# Patient Record
Sex: MALE | Race: ASIAN | Employment: FULL TIME | URBAN - METROPOLITAN AREA
[De-identification: names, ages, dates, MRNs, and addresses within clinical notes are randomized per-mention and may not be internally consistent; named-entity substitution may affect disease eponyms.]

---

## 2021-06-02 ENCOUNTER — HOSPITAL ENCOUNTER (OUTPATIENT)
Dept: GENERAL RADIOLOGY | Age: 36
Discharge: HOME | End: 2021-06-02
Payer: COMMERCIAL

## 2021-06-02 DIAGNOSIS — M25.512 PAIN OF LEFT SHOULDER REGION: ICD-10-CM

## 2021-06-02 PROCEDURE — 73030 X-RAY EXAM OF SHOULDER: CPT

## 2021-08-11 ENCOUNTER — TRANSCRIBE ORDER (OUTPATIENT)
Dept: SCHEDULING | Age: 36
End: 2021-08-11

## 2021-08-11 DIAGNOSIS — M25.512 PAIN OF LEFT SHOULDER REGION: Primary | ICD-10-CM

## 2022-07-13 ENCOUNTER — HOSPITAL ENCOUNTER (OUTPATIENT)
Dept: GENERAL RADIOLOGY | Age: 37
Discharge: HOME | End: 2022-07-13
Payer: COMMERCIAL

## 2022-07-13 DIAGNOSIS — M54.9 DORSALGIA: ICD-10-CM

## 2022-07-13 PROCEDURE — 72070 X-RAY EXAM THORAC SPINE 2VWS: CPT

## 2022-07-13 PROCEDURE — 72100 X-RAY EXAM L-S SPINE 2/3 VWS: CPT

## 2022-09-02 ENCOUNTER — HOSPITAL ENCOUNTER (OUTPATIENT)
Dept: PHYSICAL THERAPY | Age: 37
Discharge: HOME | End: 2022-09-02
Payer: COMMERCIAL

## 2022-09-02 PROCEDURE — 97530 THERAPEUTIC ACTIVITIES: CPT | Performed by: PHYSICAL THERAPIST

## 2022-09-02 PROCEDURE — 97110 THERAPEUTIC EXERCISES: CPT | Performed by: PHYSICAL THERAPIST

## 2022-09-02 PROCEDURE — 97162 PT EVAL MOD COMPLEX 30 MIN: CPT | Performed by: PHYSICAL THERAPIST

## 2022-09-02 PROCEDURE — 97140 MANUAL THERAPY 1/> REGIONS: CPT | Performed by: PHYSICAL THERAPIST

## 2022-09-02 PROCEDURE — 97112 NEUROMUSCULAR REEDUCATION: CPT | Performed by: PHYSICAL THERAPIST

## 2022-09-02 NOTE — PROGRESS NOTES
[address]  [facility], [address] [place] [address], [address] [place], [place] [address],[place] [address]  [phone] fax      Plan of Care/ Statement of Necessity for Physical Therapy Services    Patient name: Randy Gomes Start of Care: 2022   Referral source: John Araiza NP : 1985    Medical Diagnosis: Other low back pain [M54.59]  Payor: Juanis Every / Plan: Alan Rodgers PPO / Product Type: PPO /  Onset Date:8/15/22    Treatment Diagnosis: Low back Pain   Prior Hospitalization: see medical history Provider#: [id number]   Medications: Verified on Patient summary List    Comorbidities: back pain, GID, headaches   Prior Level of Function: I with ADLs, working full time, able to lift and carry heavy things without lasting pain or discomfort. The Plan of Care and following information is based on the information from the initial evaluation. Assessment/ key information: Patient is pa pleasant middle-aged adult male with sub-acute on chronic low back pain of insidious onset, associated with episodes of severe peripheral sensations into upper Left quadrant and neck. Special testing today is negative for radicular symptoms but does appear to have irritation at the thoracolumbar junction. Pain appears to be peripheral neuropathic in nature, stemming from this area. Treatment will focus on functional lifting and squatting tolerance as well as increasing lumbar extension movement pattern. Evaluation Complexity History MEDIUM  Complexity : 1-2 comorbidities / personal factors will impact the outcome/ POC ; Examination MEDIUM Complexity : 3 Standardized tests and measures addressing body structure, function, activity limitation and / or participation in recreation  ;Presentation MEDIUM Complexity : Evolving with changing characteristics  ; Clinical Decision Making MEDIUM Complexity : FOTO score of 26-74  Overall Complexity Rating: MEDIUM  Problem List: pain affecting function, decrease ROM, decrease strength, edema affecting function, impaired gait/ balance, decrease ADL/ functional abilitiies, decrease activity tolerance, decrease flexibility/ joint mobility, and decrease transfer abilities   Treatment Plan may include any combination of the following: Therapeutic exercise, Therapeutic activities, Neuromuscular re-education, Physical agent/modality, Gait/balance training, Manual therapy, Patient education, Self Care training, Functional mobility training, Home safety training, and Stair training  Patient / Family readiness to learn indicated by: asking questions, trying to perform skills, and interest  Persons(s) to be included in education: patient (P)  Barriers to Learning/Limitations: None  Patient Goal (s): I want to be able to 'double-fist' both kids (one on the way)  Patient Self Reported Health Status: good  Rehabilitation Potential: good    Short Term Goals: To be accomplished in 5 treatments:  1. Patient will be I with HEP for carryover to home management               Eval: established  2. Patient will be and to squat lift and carry 50# for 100 ft 2 times without difficulty. Eval: 50# with moderate difficulty for 50ft. 3. Patient will be able to demonstrate 10 degrees of active lumbar extension to facilitate body mechanics during functional lifting. Eval: 3 degrees    Long Term Goals: To be accomplished in 10 treatments:  1. Patient will achieve predicted FOTO score of 78 for demonstration of improved function. Eval: 71  2. Patient will be and to squat lift and carry 75# for 100 ft 2 times without difficulty. Eval: 90# with moderate difficulty for 50ft. 3. Patient will be able to demonstrate 15 degrees of active lumbar extension to facilitate body mechanics during functional lifting. Eval: 3 degrees    Frequency / Duration: Patient to be seen 1-3 times per week for 10 treatments.     Patient/ Caregiver education and instruction: Diagnosis, prognosis, self care, activity modification, and exercises   [x]  Plan of care has been reviewed with PTA    Bessie William, PT 9/2/2022 2:26 PM  ________________________________________________________________________    I certify that the above Therapy Services are being furnished while the patient is under my care. I agree with the treatment plan and certify that this therapy is necessary.     [de-identified] Signature:____________Date:_________TIME:________     Yasmany Stuart NP  ** Signature, Date and Time must be completed for valid certification **      Please sign and return to In [address] [place] [address] [address]  [address] [place] [address], [address] [place] [address], [address] [place] [address],[facility] 300 [phone] fax

## 2022-09-02 NOTE — PROGRESS NOTES
PT DAILY TREATMENT NOTE     Patient Name: Kip Davis  Date:2022  : 1985  [x]  Patient  Verified  Payor: Bobby Coffey / Plan: Prasanth Landrum PPO / Product Type: PPO /    In time:2:20P  Out time:3:05P  Total Treatment Time (min): 45min  Visit #: 1 of 10    Treatment Area: Other low back pain [M54.59]    SUBJECTIVE  Pain Level (0-10 scale): 4/10  Any medication changes, allergies to medications, adverse drug reactions, diagnosis change, or new procedure performed?: [x] No    [] Yes (see summary sheet for update)  Subjective functional status/changes:   [] No changes reported    PMHx: Usually sits straight out of bed; 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 Occasional onset of numbness and tingling on the left side that leaves him incapacitated for 3 days. Biggest complaint is when first getting up in the morning - usually feels better once gets up and moving. New mattress has reduced pain by 80%. Aggravating factors:  long positioning followed by quick motion of the neck, prolonged bending over. Eases: Temperpedic mattress, back massage    Exercise: flutter kicks, planks, sit ups, push ups, jumping jacks, rock climbs for 10 min every day    OBJECTIVE    15 min [x]Eval                  []Re-Eval       12 min Therapeutic Exercise:  [x] See flow sheet :   Rationale: increase ROM and increase strength to improve the patients ability to Tolerate basic ADLs and job-related tasks without pain. 10 min Therapeutic Activity:  [x]  See flow sheet :   Rationale: increase strength and improve coordination  to improve the patients ability to perform functional tasks such as overhead reach.      10 min Neuromuscular Re-education:  [x]  See flow sheet :   Rationale: improve coordination, improve balance, and increase proprioception  to improve the patients ability to perform activities with good form and proprioception with tactile and verbal cuing appropriately. 10 min Manual Therapy:  Grade 5 mobs to Thoraco-lumbar junction in prone in P/A direction with positive cavitation and tension release. The manual therapy interventions were performed at a separate and distinct time from the therapeutic activities interventions. Rationale: decrease pain, increase ROM, increase tissue extensibility, and decrease edema  to perform activities with good form and proprioception with tactile and verbal cuing appropriately. With   [x] TE   [x] TA   [x] neuro   [] other: Patient Education: [x] Review HEP    [x] Progressed/Changed HEP based on:   [x] positioning   [] body mechanics   [] transfers   [] heat/ice application    [] other:      Other Objective/Functional Measures:     Lumbar   extension 3 degrees  Flexion: 10 degrees  Rotation: 25 degrees  Sidebend: 15 degrees    Hip Flexion ROM: limited to 95 degrees bilaterally    MMT   Hip and leg musculature: 4/5 throughout    Abdominal strength: 4+/5      SLS: 30 secs Right and Left    Squat Test: increased lumbar flexion at the bottom, inability to achieve full position due to limited him mobility    Pain Level (0-10 scale) post treatment: 3-4/10    ASSESSMENT/Changes in Function: Patient is pa pleasant middle-aged adult male with sub-acute on chronic low back pain of insidious onset, associated with episodes of severe peripheral sensations into upper Left quadrant and neck. Special testing today is negative for radicular symptoms but does appear to have irritation at the thoracolumbar junction. Pain appears to be peripheral neuropathic in nature, stemming from this area. Treatment will focus on functional lifting and squatting tolerance as well as increasing lumbar extension movement pattern.      Patient will continue to benefit from skilled PT services to modify and progress therapeutic interventions, address functional mobility deficits, address ROM deficits, address strength deficits, analyze and address soft tissue restrictions, analyze and cue movement patterns, analyze and modify body mechanics/ergonomics, assess and modify postural abnormalities, address imbalance/dizziness, and instruct in home and community integration to attain remaining goals. [x]  See Plan of Care  []  See progress note/recertification  []  See Discharge Summary         Progress towards goals / Updated goals:  See POC    PLAN  [x]  Upgrade activities as tolerated     [x]  Continue plan of care  []  Update interventions per flow sheet       []  Discharge due to:_  []  Other:_      Sydnee Trujillo, PT 9/2/2022  2:27 PM    No future appointments.

## 2022-09-16 ENCOUNTER — HOSPITAL ENCOUNTER (OUTPATIENT)
Dept: PHYSICAL THERAPY | Age: 37
Discharge: HOME | End: 2022-09-16
Payer: COMMERCIAL

## 2022-09-16 PROCEDURE — 97112 NEUROMUSCULAR REEDUCATION: CPT

## 2022-09-16 PROCEDURE — 97140 MANUAL THERAPY 1/> REGIONS: CPT

## 2022-09-16 PROCEDURE — 97110 THERAPEUTIC EXERCISES: CPT

## 2022-09-16 NOTE — PROGRESS NOTES
PT DAILY TREATMENT NOTE     Patient Name: Pacheco Mode  TNFM:  : 1985  [x]  Patient  Verified  Payor: Lia Oakes / Plan: Estle Carbon PPO / Product Type: PPO /    In time:221 pm  Out time:310 pm   Total Treatment Time (min):49  Visit #: 2 of 10    Treatment Area: Other low back pain [M54.59]    SUBJECTIVE  Pain Level (0-10 scale): 3-4/10  Any medication changes, allergies to medications, adverse drug reactions, diagnosis change, or new procedure performed?: [x] No    [] Yes (see summary sheet for update)  Subjective functional status/changes:   [] No changes reported  Patient reports constant stiffness along his entire back today. OBJECTIVE    Modality rationale: decrease inflammation, decrease pain, and increase tissue extensibility to improve the patients ability to assist with performing ADL's and functional tasks without limitations.     Min Type Additional Details    [] Estim:  []Unatt       []IFC  []Premod                        []Other:  []w/ice   []w/heat  Position:  Location:    [] Estim: []Att    []TENS instruct  []NMES                    []Other:  []w/US   []w/ice   []w/heat  Position:  Location:    []  Traction: [] Cervical       []Lumbar                       [] Prone          []Supine                       []Intermittent   []Continuous Lbs:  [] before manual  [] after manual    []  Ultrasound: []Continuous   [] Pulsed                           []1MHz   []3MHz W/cm2:  Location:    []  Iontophoresis with dexamethasone         Location: [] Take home patch   [] In clinic   10 []  Ice     [x]  heat  []  Ice massage  []  Laser   []  Anodyne Position:prone  Location:L/S    []  Laser with stim  []  Other:  Position:  Location:    []  Vasopneumatic Device    []  Right     []  Left  Pre-treatment girth:  Post-treatment girth:  Measured at (location):  Pressure:       [] lo [] med [] hi   Temperature: [] lo [] med [] hi   [] Skin assessment post-treatment:  []intact []redness- no adverse reaction  []redness - adverse reaction:     15 min Therapeutic Exercise:  [] See flow sheet :   Rationale: increase ROM and increase strength to improve the patients overall muscle endurance and activity tolerance for ADL's and functional tasks. min Therapeutic Activity:  []  See flow sheet :   Rationale: increase strength and improve coordination  to improve the patients ability to safely perform dynamic activities and to improve functional performance of  ADL's. 14 min Neuromuscular Re-education:  []  See flow sheet :   Rationale: increase ROM, increase strength, and improve coordination  to improve the patients ability to perform activities with good form, stability and proprioception. 10 min Manual Therapy:  Shot gun mobs/MET; DTM and stretching (B) piriformis and glute med    The manual therapy interventions were performed at a separate and distinct time from the therapeutic activities interventions. Rationale: decrease pain, increase ROM, and increase tissue extensibility to assist with performing ADL's with ease. With   [] TE   [] TA   [] neuro   [] other: Patient Education: [x] Review HEP    [] Progressed/Changed HEP based on:   [] positioning   [] body mechanics   [] transfers   [] heat/ice application    [] other:      Other Objective/Functional Measures:  Initiated exercises set a initial evaluation. Verbal cues required for proper form. Right posteriorly rotated SI joint     Pain Level (0-10 scale) post treatment: 0/10     ASSESSMENT/Changes in Function:   Patient is progressing as expected towards goals. Introduced exercises, as per flow sheet, to assist with increasing core and low back strength. Patient required constant verbal cues with today's exercises. SI joint dysfunction improved following manual therapy. Patient responded well to today's overall session, as evident by, a decrease in low back pain.    Patient will continue to benefit from skilled PT services to modify and progress therapeutic interventions, address functional mobility deficits, address strength deficits, analyze and address soft tissue restrictions, analyze and cue movement patterns, analyze and modify body mechanics/ergonomics, assess and modify postural abnormalities, and instruct in home and community integration to attain remaining goals. []  See Plan of Care  []  See progress note/recertification  []  See Discharge Summary         Progress towards goals / Updated goals:     Short Term Goals: To be accomplished in 5 treatments:  1. Patient will be I with HEP for carryover to home management               Eval: established  2. Patient will be and to squat lift and carry 50# for 100 ft 2 times without difficulty. Eval: 50# with moderate difficulty for 50ft. 3. Patient will be able to demonstrate 10 degrees of active lumbar extension to facilitate body mechanics during functional lifting. Eval: 3 degrees     Long Term Goals: To be accomplished in 10 treatments:  1. Patient will achieve predicted FOTO score of 78 for demonstration of improved function. Eval: 71  2. Patient will be and to squat lift and carry 75# for 100 ft 2 times without difficulty. Eval: 90# with moderate difficulty for 50ft. 3. Patient will be able to demonstrate 15 degrees of active lumbar extension to facilitate body mechanics during functional lifting. Eval: 3 degrees    PLAN  [x]  Upgrade activities as tolerated     [x]  Continue plan of care  []  Update interventions per flow sheet       []  Discharge due to:_  []  Other:_      Carly Huizar, PTA 9/16/2022  2:48 PM    No future appointments.

## 2022-09-30 ENCOUNTER — HOSPITAL ENCOUNTER (OUTPATIENT)
Dept: PHYSICAL THERAPY | Age: 37
Discharge: HOME | End: 2022-09-30
Payer: COMMERCIAL

## 2022-09-30 PROCEDURE — 97530 THERAPEUTIC ACTIVITIES: CPT

## 2022-09-30 PROCEDURE — 97110 THERAPEUTIC EXERCISES: CPT

## 2022-09-30 PROCEDURE — 97140 MANUAL THERAPY 1/> REGIONS: CPT

## 2022-09-30 NOTE — PROGRESS NOTES
PT DAILY TREATMENT NOTE     Patient Name: Bernard Summers  Date:2022  : 1985  [x]  Patient  Verified  Payor: Neftali Jacobo / Plan: Parker Garcia PPO / Product Type: PPO /    In time:215 pm  Out time:306  pm   Total Treatment Time (min):51  Visit #: 3 of 10    Treatment Area: Other low back pain [M54.59]    SUBJECTIVE  Pain Level (0-10 scale): 10  Any medication changes, allergies to medications, adverse drug reactions, diagnosis change, or new procedure performed?: [x] No    [] Yes (see summary sheet for update)  Subjective functional status/changes:   [] No changes reported  Patient reports increased low back pain today after putting his son to bed last night. Patient explains that he got a jolt of pain when bending over to put him down. Patient also reports increased pain in both legs today. OBJECTIVE    Modality rationale: Patient declined modalities today.      Min Type Additional Details    [] Estim:  []Unatt       []IFC  []Premod                        []Other:  []w/ice   []w/heat  Position:  Location:    [] Estim: []Att    []TENS instruct  []NMES                    []Other:  []w/US   []w/ice   []w/heat  Position:  Location:    []  Traction: [] Cervical       []Lumbar                       [] Prone          []Supine                       []Intermittent   []Continuous Lbs:  [] before manual  [] after manual    []  Ultrasound: []Continuous   [] Pulsed                           []1MHz   []3MHz W/cm2:  Location:    []  Iontophoresis with dexamethasone         Location: [] Take home patch   [] In clinic    []  Ice     []  heat  []  Ice massage  []  Laser   []  Anodyne Position:  Location:    []  Laser with stim  []  Other:  Position:  Location:    []  Vasopneumatic Device    []  Right     []  Left  Pre-treatment girth:  Post-treatment girth:  Measured at (location):  Pressure:       [] lo [] med [] hi   Temperature: [] lo [] med [] hi   [] Skin assessment post-treatment:  []intact []redness- no adverse reaction  []redness - adverse reaction:     16 min Therapeutic Exercise:  [] See flow sheet :   Rationale: increase ROM and increase strength to improve the patients overall muscle endurance and activity tolerance for ADL's and functional tasks. 25 min Therapeutic Activity:  []  See flow sheet :   Rationale: increase strength and improve coordination  to improve the patients ability to safely perform dynamic activities and to improve functional performance of  ADL's.       min Neuromuscular Re-education:  []  See flow sheet :   Rationale: increase ROM, increase strength, and improve coordination  to improve the patients ability to perform activities with good form, stability and proprioception. 10 min Manual Therapy:  Shot gun mobs/MET; DTM and stretching (B) piriformis and glute med    The manual therapy interventions were performed at a separate and distinct time from the therapeutic activities interventions. Rationale: decrease pain, increase ROM, and increase tissue extensibility to assist with performing ADL's with ease. With   [] TE   [] TA   [] neuro   [] other: Patient Education: [x] Review HEP    [] Progressed/Changed HEP based on:   [] positioning   [] body mechanics   [] transfers   [] heat/ice application    [] other:      Other Objective/Functional Measures:  Functional Gains: N/A  Functional Deficits: Sleeping, lifting and flexibility  % improvement: 20-25% improvement   Pain   Average: 4-6/10       Best: 1/10     Worst: 10/10  Patient Goal: \"To improve flexibility and loosen muscles. \"     Active lumbar extension- 15 degrees    Right posteriorly rotated SI joint    Pain Level (0-10 scale) post treatment: 0/10     ASSESSMENT/Changes in Function:   Patient has only attended 3 PT visits since IE due to work and family schedule. Patient is progressing as expected. Patient continues to have increased low back pain at unexpected times that causes him to be \"immobilized\".  Sleeping, lifting and flexibility continues to be an issue for the patient. Patient reports 20-25% improvement with PT and would like to continue with PT in order to decrease low back pain and return to PLOF. Patient will continue to benefit from skilled PT services to modify and progress therapeutic interventions, address functional mobility deficits, address strength deficits, analyze and address soft tissue restrictions, analyze and cue movement patterns, analyze and modify body mechanics/ergonomics, assess and modify postural abnormalities, and instruct in home and community integration to attain remaining goals. []  See Plan of Care  [x]  See progress note/recertification  []  See Discharge Summary         Progress towards goals / Updated goals:     Short Term Goals: To be accomplished in 5 treatments:  1. Patient will be I with HEP for carryover to home management               Eval: established   Current: Patient reports compliance once a day. MET   2. Patient will be and to squat lift and carry 50# for 100 ft 2 times without difficulty. Eval: 50# with moderate difficulty for 50ft. Current: Patient is able to perform 50# box lift with no difficulty. MET     3. Patient will be able to demonstrate 10 degrees of active lumbar extension to facilitate body mechanics during functional lifting. Eval: 3 degrees    Current: Active lumbar extension- 15 degrees. MET   Long Term Goals: To be accomplished in 10 treatments:  1. Patient will achieve predicted FOTO score of 78 for demonstration of improved function. Eval: 71   Current: 71 points. REMAINS  2. Patient will be and to squat lift and carry 75# for 100 ft 2 times without difficulty. Eval: 90# with moderate difficulty for 50ft. Current: Patient experienced increased low back pain and poor form at the end of carry with 75# box lift. REMAINS  3.  Patient will be able to demonstrate 15 degrees of active lumbar extension to facilitate body mechanics during functional lifting. Eval: 3 degrees   Current: Active lumbar extension- 15 degrees. MET     PLAN  [x]  Upgrade activities as tolerated     [x]  Continue plan of care  []  Update interventions per flow sheet       []  Discharge due to:_  []  Other:_      Lucie William, JANE 9/30/2022  2:48 PM    No future appointments.

## 2022-09-30 NOTE — PROGRESS NOTES
[facility]  [facility], [address] [place] [address], [address] [place], [place] [address]y [address]  [phone] fax    Physician Update  [x] Progress Note  [] Discharge Summary  Patient name: Niurka Chavez Start of Care: 2022   Referral source: Floyd Thomas NP : 1985                Medical Diagnosis: Other low back pain [M54.59]  Payor: Howard Moyeriling / Plan: Mica Desir PPO / Product Type: PPO /  Onset Date:8/15/22                Treatment Diagnosis: Low back Pain   Prior Hospitalization: see medical history Provider#: [id number]   Medications: Verified on Patient summary List    Comorbidities: back pain, GID, headaches   Prior Level of Function: I with ADLs, working full time, able to lift and carry heavy things without lasting pain or discomfort. Visits from Start of Care: 3   Missed Visits: 0    Status at Evaluation/Last Progress Note:   Patient is pa pleasant middle-aged adult male with sub-acute on chronic low back pain of insidious onset, associated with episodes of severe peripheral sensations into upper Left quadrant and neck. Special testing today is negative for radicular symptoms but does appear to have irritation at the thoracolumbar junction. Pain appears to be peripheral neuropathic in nature, stemming from this area. Treatment will focus on functional lifting and squatting tolerance as well as increasing lumbar extension movement pattern. Progress towards Goals:   Functional Gains: N/A  Functional Deficits: Sleeping, lifting and flexibility  % improvement: 20-25% improvement   Pain   Average: -6/10                  Best: 1/10                Worst: 10/10  Patient Goal: \"To improve flexibility and loosen muscles. \"      Short Term Goals: To be accomplished in 5 treatments:  1. Patient will be I with [facility] for carryover to home management               Eval: established              Current: Patient reports compliance once a day. MET   2.  Patient will be and to squat lift and carry 50# for 100 ft 2 times without difficulty. Eval: 50# with moderate difficulty for 50ft. Current: Patient is able to perform 50# box lift with no difficulty. MET      3. Patient will be able to demonstrate 10 degrees of active lumbar extension to facilitate body mechanics during functional lifting. Eval: 3 degrees               Current: Active lumbar extension- 15 degrees. MET   Long Term Goals: To be accomplished in 10 treatments:  1. Patient will achieve predicted FOTO score of 78 for demonstration of improved function. Eval: 71              Current: 71 points. REMAINS  2. Patient will be and to squat lift and carry 75# for 100 ft 2 times without difficulty. Eval: 90# with moderate difficulty for 50ft. Current: Patient experienced increased low back pain and poor form at the end of carry with 75# box lift. REMAINS  3. Patient will be able to demonstrate 15 degrees of active lumbar extension to facilitate body mechanics during functional lifting. Eval: 3 degrees              Current: Active lumbar extension- 15 degrees. MET     Goals: to be achieved in 4 weeks:  1. Patient will achieve predicted FOTO score of 78 for demonstration of improved function. PN: Unchanged from IE, 71 points. 2. Patient will be and to squat lift and carry 75# for 100 ft 2 times without difficulty. PN: Patient experienced increased low back pain and poor form at the end of carry with 75# box lift. 3. Patient will report 2-3 consecutive hours of sleep with 2/10 low back pain in order to improve overall health and quality of life. PN: Patient sleeps for 30-45 minutes before waking up due to increased low back pain (6/10) and having to reposition himself. ASSESSMENT/RECOMMENDATIONS:  Patient has only attended 3 PT visits since IE due to work and family schedule. Patient is progressing as expected.  Patient continues to have increased low back pain at unexpected times that causes him to be \"immobilized\". Sleeping, lifting and flexibility continues to be an issue for the patient. Patient reports 20-25% improvement with PT and would like to continue with PT in order to decrease low back pain and return to PLOF. Patient will continue to benefit from skilled PT services to modify and progress therapeutic interventions, address functional mobility deficits, address strength deficits, analyze and address soft tissue restrictions, analyze and cue movement patterns, analyze and modify body mechanics/ergonomics, assess and modify postural abnormalities, and instruct in home and community integration to attain remaining goals. [x]Continue therapy per initial plan/protocol at a frequency of 1-2 x per week for 4 weeks  []Continue therapy with the following recommended changes:_____________________      _____________________________________________________________________  []Discontinue therapy progressing towards or have reached established goals  []Discontinue therapy due to lack of appreciable progress towards goals  []Discontinue therapy due to lack of attendance or compliance  []Await Physician's recommendations/decisions regarding therapy  []Other:________________________________________________________________    Thank you for this referral. Renata Schroeder, PTA 9/30/2022 3:00 PM  NOTE TO PHYSICIAN:  PLEASE COMPLETE THE ORDERS BELOW AND   FAX TO [facility]: [phone]  If you are unable to process this request in 24 hours please contact our office: [phone]    I have read the above report and request that my patient continue as recommended. I have read the above report and request that my patient continue therapy with the following changes/special instructions:_____________________________________  I have read the above report and request that my patient be discharged from therapy.     [facility] Signature:____________Date:_________TIME:________     Carmen Walter NP  ** Signature, Date and Time must be completed for valid certification **

## 2022-10-14 ENCOUNTER — APPOINTMENT (OUTPATIENT)
Dept: PHYSICAL THERAPY | Age: 37
End: 2022-10-14
Payer: COMMERCIAL

## 2022-10-20 ENCOUNTER — HOSPITAL ENCOUNTER (OUTPATIENT)
Dept: PHYSICAL THERAPY | Age: 37
Discharge: HOME | End: 2022-10-20
Payer: COMMERCIAL

## 2022-10-20 PROCEDURE — 97530 THERAPEUTIC ACTIVITIES: CPT

## 2022-10-20 PROCEDURE — 97112 NEUROMUSCULAR REEDUCATION: CPT

## 2022-10-20 PROCEDURE — 97110 THERAPEUTIC EXERCISES: CPT

## 2022-10-20 NOTE — PROGRESS NOTES
PT DAILY TREATMENT NOTE     Patient Name: Joni Weldon  HX:  : 1985  [x]  Patient  Verified  Payor: Howard Mijares / Plan: Gabriela Sandoval PPO / Product Type: PPO /    In time: 6:00 Out time:6:34  Total Treatment Time (min): 34  Visit #: 4 of 10    Medicare/BCBS Only   Total Timed Codes (min):   1:1 Treatment Time:         Treatment Area: Other low back pain [M54.59]    SUBJECTIVE  Pain Level (0-10 scale): 4  Any medication changes, allergies to medications, adverse drug reactions, diagnosis change, or new procedure performed?: [x] No    [] Yes (see summary sheet for update)  Subjective functional status/changes:   [] No changes reported  \"I have pain at my traps. \"    OBJECTIVE    Modality rationale: decrease inflammation, decrease pain, and increase tissue extensibility to improve the patients ability to perform ADL    Min Type Additional Details    [] Estim:  []Unatt       []IFC  []Premod                        []Other:  []w/ice   []w/heat  Position:  Location:    [] Estim: []Att    []TENS instruct  []NMES                    []Other:  []w/US   []w/ice   []w/heat  Position:  Location:    []  Traction: [] Cervical       []Lumbar                       [] Prone          []Supine                       []Intermittent   []Continuous Lbs:  [] before manual  [] after manual    []  Ultrasound: []Continuous   [] Pulsed                           []1MHz   []3MHz W/cm2:  Location:    []  Iontophoresis with dexamethasone         Location: [] Take home patch   [] In clinic    []  Ice     []  heat  []  Ice massage  []  Laser   []  Anodyne Position:  Location:    []  Laser with stim  []  Other:  Position:  Location:    []  Vasopneumatic Device    []  Right     []  Left  Pre-treatment girth:  Post-treatment girth:  Measured at (location):  Pressure:       [] lo [] med [] hi   Temperature: [] lo [] med [] hi   [x] Skin assessment post-treatment:  [x]intact []redness- no adverse reaction    []redness - adverse reaction:      min []Eval                  []Re-Eval       16 min Therapeutic Exercise:  [x] See flow sheet :   Rationale: increase ROM and increase strength to improve the patients ability to perform ADL     10 min Therapeutic Activity:  [x]  See flow sheet :   Rationale: increase ROM, increase strength, and improve coordination  to improve the patients ability to perform work duties     8 min Neuromuscular Re-education:  [x]  See flow sheet :   Rationale: increase ROM, increase strength, improve coordination, improve balance, and increase proprioception  to improve the patients ability to ascend/descend stairs with no difficulty     min Manual Therapy: The manual therapy interventions were performed at a separate and distinct time from the therapeutic activities interventions. Rationale: decrease pain, increase ROM, increase tissue extensibility, decrease trigger points, and increase postural awareness to perform ADL      min Gait Training:  ___ feet with ___ device on level surfaces with ___ level of assist   Rationale:     min Self Care/Home Management:    Rationale: increase ROM, increase strength, and improve coordination  to improve the patients ability to perform ADL           With   [x] TE   [x] TA   [] neuro   [] other: Patient Education: [x] Review HEP    [] Progressed/Changed HEP based on:   [] positioning   [] body mechanics   [] transfers   [] heat/ice application    [] other:      Other Objective/Functional Measures:      Pain Level (0-10 scale) post treatment: 0    ASSESSMENT/Changes in Function: Today's session focused on pain reduction at (B) traps/TSP, core and LSP strengthening. Pt completed each core and LSP there ex fairly well with no pain. Pt presents with min/mod tightness along spine during manual.Overall pt is progressing towards all goals.     Patient will continue to benefit from skilled PT services to modify and progress therapeutic interventions, address functional mobility deficits, address ROM deficits, address strength deficits, analyze and address soft tissue restrictions, analyze and cue movement patterns, analyze and modify body mechanics/ergonomics, assess and modify postural abnormalities, and instruct in home and community integration to attain remaining goals. [x]  See Plan of Care  []  See progress note/recertification  []  See Discharge Summary         Progress towards goals / Updated goals:  1. Patient will be I with [facility] for carryover to home management               Eval: established              Current: Patient reports compliance once a day. MET   2. Patient will be and to squat lift and carry 50# for 100 ft 2 times without difficulty. Eval: 50# with moderate difficulty for 50ft. Current: Patient is able to perform 50# box lift with no difficulty. MET      3. Patient will be able to demonstrate 10 degrees of active lumbar extension to facilitate body mechanics during functional lifting. Eval: 3 degrees               Current: Active lumbar extension- 15 degrees. MET   Long Term Goals: To be accomplished in 10 treatments:  1. Patient will achieve predicted FOTO score of 78 for demonstration of improved function. Eval: 71              Current: 71 points. REMAINS  2. Patient will be and to squat lift and carry 75# for 100 ft 2 times without difficulty. Eval: 90# with moderate difficulty for 50ft. Current: Patient experienced increased low back pain and poor form at the end of carry with 75# box lift. REMAINS  3. Patient will be able to demonstrate 15 degrees of active lumbar extension to facilitate body mechanics during functional lifting. Eval: 3 degrees              Current: Active lumbar extension- 15 degrees.  MET        PLAN  [x]  Upgrade activities as tolerated     []  Continue plan of care  []  Update interventions per flow sheet       []  Discharge due to:_  []  Other:_ Kimberly Green, JANE 10/20/2022  6:01 PM    No future appointments.

## 2022-11-23 NOTE — PROGRESS NOTES
[facility] [address] [place] [id number] [facility], [address] [place] [address], [address] [place], [place] [address] [address]  [phone] fax    Discharge Summary    Referral source: Trang Camacho NP : 1985                Medical Diagnosis: Other low back pain [M54.59]  Payor: Pita Fletcher / Plan: Jarrell Peoples PPO / Product Type: PPO /  Onset Date:8/15/22                Treatment Diagnosis: Low back Pain   Prior Hospitalization: see medical history Provider#: [id number]   Medications: Verified on Patient summary List    Comorbidities: back pain, GID, headaches   Prior Level of Function: I with ADLs, working full time, able to lift and carry heavy things without lasting pain or discomfort. Visits from Start of Care: 4    Missed Visits: 1   Reporting Period :  to 10/20/2022      Summary of Care:  Goal:Patient will achieve predicted FOTO score of 78 for demonstration of improved function. Status at last note/certification:Unchanged from IE, 71 points  Status at discharge: not met    Goal:. Patient will be and to squat lift and carry 75# for 100 ft 2 times without difficulty. Status at last note/certification:Patient experienced increased low back pain and poor form at the end of carry with 75# box lift. Status at discharge: not met    Goal: Patient will report 2-3 consecutive hours of sleep with 2/10 low back pain in order to improve overall health and quality of life. Status at last note/certification[de-identified] Patient sleeps for 30-45 minutes before waking up due to increased low back pain (6/10) and having to reposition himself.    Status at discharge: not met    ASSESSMENT/RECOMMENDATIONS:  []Discontinue therapy progressing towards or have reached established goals  []Discontinue therapy due to lack of appreciable progress towards goals  []Discontinue therapy due to lack of attendance or compliance  [x]Other: Patient to be discharged at this time due to not scheduling follow PT visits in a timely everton.      Thank you for this referral.     Rubio Tolentino, PTA 11/23/2022 6:51 PM

## 2022-11-28 ENCOUNTER — TRANSCRIBE ORDER (OUTPATIENT)
Dept: SCHEDULING | Age: 37
End: 2022-11-28

## 2022-11-28 DIAGNOSIS — M25.512 LEFT SHOULDER PAIN: Primary | ICD-10-CM

## 2022-12-28 ENCOUNTER — TRANSCRIBE ORDER (OUTPATIENT)
Dept: SCHEDULING | Age: 37
End: 2022-12-28

## 2022-12-28 DIAGNOSIS — M54.50 LOW BACK PAIN: Primary | ICD-10-CM

## 2023-01-28 ENCOUNTER — HOSPITAL ENCOUNTER (OUTPATIENT)
Age: 38
End: 2023-01-28
Attending: PHYSICIAN ASSISTANT
Payer: COMMERCIAL

## 2023-01-28 DIAGNOSIS — M25.512 LEFT SHOULDER PAIN: ICD-10-CM

## 2023-01-28 DIAGNOSIS — M54.50 LOW BACK PAIN: ICD-10-CM

## 2023-01-28 PROCEDURE — 73221 MRI JOINT UPR EXTREM W/O DYE: CPT

## 2023-01-28 PROCEDURE — 72148 MRI LUMBAR SPINE W/O DYE: CPT

## 2023-03-29 ENCOUNTER — OFFICE VISIT (OUTPATIENT)
Age: 38
End: 2023-03-29
Payer: COMMERCIAL

## 2023-03-29 VITALS
HEIGHT: 69 IN | TEMPERATURE: 98.1 F | OXYGEN SATURATION: 98 % | HEART RATE: 74 BPM | BODY MASS INDEX: 28.73 KG/M2 | WEIGHT: 194 LBS

## 2023-03-29 DIAGNOSIS — M62.838 MUSCLE SPASM: ICD-10-CM

## 2023-03-29 DIAGNOSIS — M47.816 LUMBAR FACET ARTHROPATHY: Primary | ICD-10-CM

## 2023-03-29 DIAGNOSIS — M51.36 DDD (DEGENERATIVE DISC DISEASE), LUMBAR: ICD-10-CM

## 2023-03-29 PROBLEM — K50.80 CROHN'S DISEASE OF BOTH SMALL AND LARGE INTESTINE (HCC): Status: ACTIVE | Noted: 2021-10-21

## 2023-03-29 PROCEDURE — 99203 OFFICE O/P NEW LOW 30 MIN: CPT | Performed by: PHYSICAL MEDICINE & REHABILITATION

## 2023-03-29 RX ORDER — CETIRIZINE HYDROCHLORIDE 10 MG/1
10 TABLET ORAL DAILY
COMMUNITY
Start: 2022-12-20

## 2023-03-29 RX ORDER — AMLODIPINE BESYLATE 5 MG/1
TABLET ORAL
COMMUNITY
Start: 2023-03-27

## 2023-03-29 RX ORDER — SILDENAFIL 25 MG/1
25 TABLET, FILM COATED ORAL DAILY
COMMUNITY
Start: 2023-02-25

## 2023-03-29 RX ORDER — DEXTROAMPHETAMINE SACCHARATE, AMPHETAMINE ASPARTATE, DEXTROAMPHETAMINE SULFATE AND AMPHETAMINE SULFATE 2.5; 2.5; 2.5; 2.5 MG/1; MG/1; MG/1; MG/1
1 TABLET ORAL 2 TIMES DAILY
COMMUNITY
Start: 2022-12-23

## 2023-03-29 RX ORDER — METHYLPREDNISOLONE 4 MG/1
TABLET ORAL
Qty: 1 KIT | Refills: 0 | Status: SHIPPED | OUTPATIENT
Start: 2023-03-29

## 2023-03-29 RX ORDER — MELOXICAM 15 MG/1
15 TABLET ORAL DAILY
Qty: 30 TABLET | Refills: 1 | Status: SHIPPED | OUTPATIENT
Start: 2023-03-29

## 2023-03-29 ASSESSMENT — PATIENT HEALTH QUESTIONNAIRE - PHQ9
2. FEELING DOWN, DEPRESSED OR HOPELESS: 0
1. LITTLE INTEREST OR PLEASURE IN DOING THINGS: 0
SUM OF ALL RESPONSES TO PHQ QUESTIONS 1-9: 0
SUM OF ALL RESPONSES TO PHQ9 QUESTIONS 1 & 2: 0
SUM OF ALL RESPONSES TO PHQ QUESTIONS 1-9: 0

## 2023-03-29 NOTE — PROGRESS NOTES
Heather Pollock presents today for   Chief Complaint   Patient presents with    Back Pain       Is someone accompanying this pt? no    Is the patient using any DME equipment during OV? no    Depression Screening:  No flowsheet data found. Learning Assessment:  No flowsheet data found. Abuse Screening:  No flowsheet data found. Fall Risk  No flowsheet data found. OPIOID RISK TOOL  No flowsheet data found. Coordination of Care:  1. Have you been to the ER, urgent care clinic since your last visit? no  Hospitalized since your last visit? no    2. Have you seen or consulted any other health care providers outside of the [address] [place] [address] since your last visit? no Include any pap smears or colon screening.  no
expected lumbar lordosis. Dextroconvex curvature of the lumbar spine. No evidence of acute fracture. Bone marrow signal: Heterogeneous, nonspecific. Conus/filum: Normal in appearance and terminates at an appropriate level. Intervertebral disc height: Mild intervertebral disc space narrowing at L4-L5. Paraspinal tissues: The paraspinous soft tissues are within normal limits. Other: Lower lumbar congenital central canal narrowing     Specific segmental anatomy is as follows:     T12-L1: Central canal and neural foramina appear patent. L1-2: Mild broad-based disc bulge with small left paracentral protrusion disc  herniation. No significant central canal or foraminal narrowing. L2-3: Central canal and neural foramina appear patent. L3-4: Mild broad-based disc bulge with small central extrusion disc herniation. No significant central canal or foraminal narrowing. .     L4-5: Broad-based disc bulge/protrusion disc herniation with bilateral  subarticular recess narrowing, right greater than left. Contact and some  component of impingement of bilateral L5 nerve roots, right greater than left. Mild bilateral foraminal narrowing. Mild/moderate central canal narrowing. L5-S1: Central canal and neural foramina appear patent. IMPRESSION:     Straightening of expected lumbar lordosis and mild dextroconvex curvature of the  lumbar spine. Degenerative changes are accentuated in this patient with lower lumbar  congenital central canal narrowing. At L4-L5, broad-based disc bulge/protrusion disc herniation with subarticular  recess narrowing. Asymmetric contact/impingement of the right L5 nerve root at  its subarticular recess course. Mild/moderate central canal narrowing    Written by Volanda Cushing, as dictated by Jose Casillas MD.  I, Dr. Jose Casillas confirm that all documentation is accurate.

## 2023-05-23 ENCOUNTER — OFFICE VISIT (OUTPATIENT)
Age: 38
End: 2023-05-23
Payer: COMMERCIAL

## 2023-05-23 VITALS
HEIGHT: 69 IN | BODY MASS INDEX: 26.96 KG/M2 | HEART RATE: 74 BPM | SYSTOLIC BLOOD PRESSURE: 117 MMHG | TEMPERATURE: 98 F | DIASTOLIC BLOOD PRESSURE: 80 MMHG | OXYGEN SATURATION: 98 % | WEIGHT: 182 LBS

## 2023-05-23 DIAGNOSIS — M51.26 HNP (HERNIATED NUCLEUS PULPOSUS), LUMBAR: Primary | ICD-10-CM

## 2023-05-23 DIAGNOSIS — M62.838 MUSCLE SPASM: ICD-10-CM

## 2023-05-23 DIAGNOSIS — M54.16 LUMBAR RADICULITIS: ICD-10-CM

## 2023-05-23 DIAGNOSIS — M47.816 LUMBAR FACET ARTHROPATHY: ICD-10-CM

## 2023-05-23 PROCEDURE — 99214 OFFICE O/P EST MOD 30 MIN: CPT | Performed by: PHYSICAL MEDICINE & REHABILITATION

## 2023-05-23 RX ORDER — CITALOPRAM 10 MG/1
10 TABLET ORAL DAILY
COMMUNITY
Start: 2023-05-04

## 2023-05-23 NOTE — PROGRESS NOTES
Macy Espinoza presents today for   Chief Complaint   Patient presents with    Back Pain       Is someone accompanying this pt? no    Is the patient using any DME equipment during OV? no    Coordination of Care:  1. Have you been to the ER, urgent care clinic since your last visit? no  Hospitalized since your last visit? no    2. Have you seen or consulted any other health care providers outside of the [address] [place] [address] since your last visit? Yes, PCP Include any pap smears or colon screening.  no
significant central canal or foraminal narrowing. .     L4-5: Broad-based disc bulge/protrusion disc herniation with bilateral  subarticular recess narrowing, right greater than left. Contact and some  component of impingement of bilateral L5 nerve roots, right greater than left. Mild bilateral foraminal narrowing. Mild/moderate central canal narrowing. L5-S1: Central canal and neural foramina appear patent. IMPRESSION:     Straightening of expected lumbar lordosis and mild dextroconvex curvature of the  lumbar spine. Degenerative changes are accentuated in this patient with lower lumbar  congenital central canal narrowing. At L4-L5, broad-based disc bulge/protrusion disc herniation with subarticular  recess narrowing. Asymmetric contact/impingement of the right L5 nerve root at  its subarticular recess course. Mild/moderate central canal narrowing    Lumbar spine MRI from 07/13/2022 was personally reviewed with the patient and demonstrated:  FINDINGS: Thoracic: There are 12 thoracic type vertebra. The alignment of the  thoracic spine is unremarkable. No evidence for acute fracture or dislocation. Pedicles are present at every level. The disc space heights are unremarkable. Lumbar: There are 5 lumbar-type vertebra. There is likely pseudoarticulation  between the right transverse process of L5 and the right sacral ala. There is  dextrocurvature of the lumbar spine. No fracture appreciated. The facets are  appropriately aligned. The disc space heights are unremarkable. IMPRESSION:  1. No acute pathology appreciated in the thoracic spine. 2.  No acute pathology appreciated in the lumbar spine     3. Dextrocurvature of the lumbar spine. 4.  Likely pseudoarticulation between the right L5 transverse process and the  right sacral ala which can be a source for pain in some patients.      Written by John Solis, as dictated by Prerna Mckenzie MD.  I, Dr. Jacob Victor

## 2023-07-24 ENCOUNTER — OFFICE VISIT (OUTPATIENT)
Age: 38
End: 2023-07-24
Payer: COMMERCIAL

## 2023-07-24 VITALS
HEIGHT: 69 IN | WEIGHT: 189 LBS | OXYGEN SATURATION: 98 % | BODY MASS INDEX: 27.99 KG/M2 | SYSTOLIC BLOOD PRESSURE: 115 MMHG | DIASTOLIC BLOOD PRESSURE: 73 MMHG | TEMPERATURE: 98.3 F | HEART RATE: 62 BPM

## 2023-07-24 DIAGNOSIS — M47.816 LUMBAR FACET ARTHROPATHY: ICD-10-CM

## 2023-07-24 DIAGNOSIS — M51.26 HNP (HERNIATED NUCLEUS PULPOSUS), LUMBAR: Primary | ICD-10-CM

## 2023-07-24 DIAGNOSIS — M54.16 LUMBAR RADICULITIS: ICD-10-CM

## 2023-07-24 DIAGNOSIS — M48.061 SPINAL STENOSIS OF LUMBAR REGION WITHOUT NEUROGENIC CLAUDICATION: ICD-10-CM

## 2023-07-24 PROCEDURE — 99213 OFFICE O/P EST LOW 20 MIN: CPT | Performed by: PHYSICAL MEDICINE & REHABILITATION

## 2023-07-24 RX ORDER — MELOXICAM 15 MG/1
15 TABLET ORAL DAILY
Qty: 30 TABLET | Refills: 3 | Status: SHIPPED | OUTPATIENT
Start: 2023-07-24

## 2023-07-24 NOTE — PROGRESS NOTES
Bryant Nascimento presents today for   Chief Complaint   Patient presents with    Back Pain       Is someone accompanying this pt? no    Is the patient using any DME equipment during OV? no    Depression Screening:  PHQ-9 Questionaire 3/29/2023   Little interest or pleasure in doing things 0   Feeling down, depressed, or hopeless 0   PHQ-9 Total Score 0     PHQ Scores 3/29/2023   PHQ2 Score 0   PHQ9 Score 0       Coordination of Care:  1. Have you been to the ER, urgent care clinic since your last visit? no  Hospitalized since your last visit? no    2. Have you seen or consulted any other health care providers outside of the [address] [place] [address] since your last visit? no Include any pap smears or colon screening.  no

## 2023-07-24 NOTE — PROGRESS NOTES
[facility]  [address] [place] [address], [address] [facility], [address] [place] [address]   Phone: [phone]  Fax: [phone]    Pt's YOB: 1985    ASSESSMENT   Santi Garrido was seen today for back pain. Diagnoses and all orders for this visit:    HNP (herniated nucleus pulposus), lumbar    Lumbar radiculitis    Lumbar facet arthropathy  -     meloxicam (MOBIC) 15 MG tablet; Take 1 tablet by mouth daily    Spinal stenosis of lumbar region without neurogenic claudication         IMPRESSION AND PLAN:  Tiny Pagan is a 30 y.o. male with history of lumbar pain and presents to the office today for injection follow up. Pt continues to complain of right-sided lumbar pain with intermittent radicular symptoms into the left lower extremity and occasional cramping in the bilateral lower extremities, unchanged since his last office visit. At his last OV, he was referred to orthopedics for a  L4/5 epidural injection. Pt reports he was not contacted about receiving the L4/5 epidural injection and  still desires to pursue receiving the injection. As per his last note, he reports relief with previously prescribed  Medrol Dosepak on 03/29/2023. He is taking Mobic 15 mg 1 tab daily and  Flexeril 10 MG occasionally. 1) He Is taking Mobic 15 mg 1 tab daily and received refills at this time. 2) Mr. Guerita Martin has a reminder for a \"due or due soon\" health maintenance. I have asked that he contact his primary care provider, Kashif Fuentes MD, for follow-up on this health maintenance. 3)  demonstrated consistency with prescribing. 4) Pt will schedule the lumbar epidural injection today before he leaves the office  Return in about 6 weeks (around 9/4/2023) for injection follow up. HISTORY OF PRESENT ILLNESS:  Tiny Pagan is a 30 y.o. male with history of lumbar pain and presents to the office today for injection follow up.  Pt continues to complain of right-sided lumbar pain with

## 2023-08-10 ENCOUNTER — HOSPITAL ENCOUNTER (OUTPATIENT)
Facility: HOSPITAL | Age: 38
End: 2023-08-10
Payer: COMMERCIAL

## 2023-08-10 VITALS
RESPIRATION RATE: 16 BRPM | TEMPERATURE: 98.3 F | HEART RATE: 61 BPM | DIASTOLIC BLOOD PRESSURE: 80 MMHG | SYSTOLIC BLOOD PRESSURE: 118 MMHG | OXYGEN SATURATION: 99 %

## 2023-08-10 DIAGNOSIS — M47.816 LUMBAR FACET ARTHROPATHY: ICD-10-CM

## 2023-08-10 PROBLEM — M51.26 HERNIATED NUCLEUS PULPOSUS, LUMBAR: Status: ACTIVE | Noted: 2023-08-10

## 2023-08-10 PROCEDURE — 6370000000 HC RX 637 (ALT 250 FOR IP): Performed by: PHYSICAL MEDICINE & REHABILITATION

## 2023-08-10 PROCEDURE — 6360000002 HC RX W HCPCS: Performed by: PHYSICAL MEDICINE & REHABILITATION

## 2023-08-10 PROCEDURE — 2500000003 HC RX 250 WO HCPCS: Performed by: PHYSICAL MEDICINE & REHABILITATION

## 2023-08-10 PROCEDURE — 62323 NJX INTERLAMINAR LMBR/SAC: CPT | Performed by: PHYSICAL MEDICINE & REHABILITATION

## 2023-08-10 PROCEDURE — 62323 NJX INTERLAMINAR LMBR/SAC: CPT

## 2023-08-10 RX ORDER — DIAZEPAM 5 MG/1
5 TABLET ORAL ONCE
Status: COMPLETED | OUTPATIENT
Start: 2023-08-10 | End: 2023-08-10

## 2023-08-10 RX ORDER — LIDOCAINE HYDROCHLORIDE 10 MG/ML
30 INJECTION, SOLUTION EPIDURAL; INFILTRATION; INTRACAUDAL; PERINEURAL ONCE
Status: COMPLETED | OUTPATIENT
Start: 2023-08-10 | End: 2023-08-10

## 2023-08-10 RX ORDER — DIAZEPAM 5 MG/1
2.5 TABLET ORAL ONCE
Status: COMPLETED | OUTPATIENT
Start: 2023-08-10 | End: 2023-08-10

## 2023-08-10 RX ORDER — DEXAMETHASONE SODIUM PHOSPHATE 10 MG/ML
10 INJECTION, SOLUTION INTRAMUSCULAR; INTRAVENOUS ONCE
Status: COMPLETED | OUTPATIENT
Start: 2023-08-10 | End: 2023-08-10

## 2023-08-10 RX ORDER — DIAZEPAM 5 MG/1
10 TABLET ORAL ONCE
Status: COMPLETED | OUTPATIENT
Start: 2023-08-10 | End: 2023-08-10

## 2023-08-10 RX ADMIN — DIAZEPAM 5 MG: 5 TABLET ORAL at 12:24

## 2023-08-10 RX ADMIN — DEXAMETHASONE SODIUM PHOSPHATE 10 MG: 10 INJECTION, SOLUTION INTRAMUSCULAR; INTRAVENOUS at 13:42

## 2023-08-10 RX ADMIN — LIDOCAINE HYDROCHLORIDE 12 ML: 10 INJECTION, SOLUTION EPIDURAL; INFILTRATION; INTRACAUDAL; PERINEURAL at 13:40

## 2023-08-10 ASSESSMENT — PAIN SCALES - GENERAL: PAINLEVEL_OUTOF10: 0

## 2023-08-10 ASSESSMENT — PAIN - FUNCTIONAL ASSESSMENT: PAIN_FUNCTIONAL_ASSESSMENT: 0-10

## 2023-08-10 NOTE — OP NOTE
Intralaminar Epidural Steroid Block Procedure Note      Patient Name: Tiny Pagan    Date of Procedure: August 10, 2023    Preoperative Diagnosis: M51.26    Post Operative Diagnosis: M51.26    Procedure: L4-L5 Epidural Block     PROCEDURE:  Lumbar Epidural Block    Consent:  Informed  Consent was obtained prior to the procedure. The patient was given the opportunity to ask questions regarding the procedure and its associated risks. In addition to the potential risks associated with the procedure itself, the patient was informed both verbally and in writing of potential side effects of the use glucocorticoids. The patient appeared to comprehend the informed consent and desired to have the procedure performed. The patient was placed in the prone position on the fluoroscopy table and the back prepped and draped in the usual sterile manner. The interlaminar space was identified using fluoroscopy and the skin anesthetized with 1% Lidocaine. A #18 Tuohy Epidural needle was advanced under fluoroscopic control from a paramedian approach to the  epidural space as noted above, using the loss of resistance to fluid technique. Then, 10 mg of preservative free Dexamethasone and  cc of Lidocaine was slowly injected. The patient tolerated the procedure well. The injection area was cleaned and band aids applied. No excessive bleeding was noted. Patient dressed and was discharged to home with instructions.

## 2023-09-20 ENCOUNTER — OFFICE VISIT (OUTPATIENT)
Age: 38
End: 2023-09-20
Payer: COMMERCIAL

## 2023-09-20 VITALS
DIASTOLIC BLOOD PRESSURE: 74 MMHG | WEIGHT: 190 LBS | SYSTOLIC BLOOD PRESSURE: 112 MMHG | HEART RATE: 82 BPM | OXYGEN SATURATION: 97 % | RESPIRATION RATE: 16 BRPM | BODY MASS INDEX: 28.14 KG/M2 | HEIGHT: 69 IN

## 2023-09-20 DIAGNOSIS — M62.838 MUSCLE SPASM: ICD-10-CM

## 2023-09-20 DIAGNOSIS — M47.816 LUMBAR FACET ARTHROPATHY: ICD-10-CM

## 2023-09-20 DIAGNOSIS — M54.16 LUMBAR RADICULITIS: Primary | ICD-10-CM

## 2023-09-20 PROCEDURE — 99214 OFFICE O/P EST MOD 30 MIN: CPT | Performed by: PHYSICAL MEDICINE & REHABILITATION

## 2023-09-20 RX ORDER — MELOXICAM 15 MG/1
15 TABLET ORAL DAILY
Qty: 30 TABLET | Refills: 3 | Status: SHIPPED | OUTPATIENT
Start: 2023-09-20

## 2023-09-20 RX ORDER — DEXTROAMPHETAMINE SACCHARATE, AMPHETAMINE ASPARTATE, DEXTROAMPHETAMINE SULFATE AND AMPHETAMINE SULFATE 5; 5; 5; 5 MG/1; MG/1; MG/1; MG/1
1 TABLET ORAL DAILY
COMMUNITY
Start: 2023-08-18

## 2023-09-20 RX ORDER — METHYLPREDNISOLONE 4 MG/1
TABLET ORAL
Qty: 1 KIT | Refills: 1 | Status: SHIPPED | OUTPATIENT
Start: 2023-09-20 | End: 2023-09-26

## 2023-09-20 NOTE — PROGRESS NOTES
subarticular recess course. Mild/moderate central canal narrowing     Lumbar spine MRI from 07/13/2022 was personally reviewed with the patient and demonstrated:  FINDINGS: Thoracic: There are 12 thoracic type vertebra. The alignment of the  thoracic spine is unremarkable. No evidence for acute fracture or dislocation. Pedicles are present at every level. The disc space heights are unremarkable. Lumbar: There are 5 lumbar-type vertebra. There is likely pseudoarticulation  between the right transverse process of L5 and the right sacral ala. There is  dextrocurvature of the lumbar spine. No fracture appreciated. The facets are  appropriately aligned. The disc space heights are unremarkable. IMPRESSION:  1. No acute pathology appreciated in the thoracic spine. 2.  No acute pathology appreciated in the lumbar spine     3. Dextrocurvature of the lumbar spine. 4.  Likely pseudoarticulation between the right L5 transverse process and the  right sacral ala which can be a source for pain in some patients. Written by Renay Fallon, as dictated by Louisa Arndt MD.  I, Dr. Louisa Arndt confirm that all documentation is accurate.

## 2023-11-27 ENCOUNTER — OFFICE VISIT (OUTPATIENT)
Age: 38
End: 2023-11-27
Payer: COMMERCIAL

## 2023-11-27 VITALS
RESPIRATION RATE: 18 BRPM | HEART RATE: 79 BPM | DIASTOLIC BLOOD PRESSURE: 75 MMHG | SYSTOLIC BLOOD PRESSURE: 117 MMHG | HEIGHT: 69 IN | BODY MASS INDEX: 28.06 KG/M2

## 2023-11-27 DIAGNOSIS — M51.26 HNP (HERNIATED NUCLEUS PULPOSUS), LUMBAR: ICD-10-CM

## 2023-11-27 DIAGNOSIS — M54.16 LUMBAR RADICULITIS: ICD-10-CM

## 2023-11-27 DIAGNOSIS — M47.816 LUMBAR FACET ARTHROPATHY: Primary | ICD-10-CM

## 2023-11-27 DIAGNOSIS — M62.838 MUSCLE SPASM: ICD-10-CM

## 2023-11-27 PROCEDURE — 99213 OFFICE O/P EST LOW 20 MIN: CPT | Performed by: PHYSICAL MEDICINE & REHABILITATION

## 2023-11-27 RX ORDER — ESCITALOPRAM OXALATE 5 MG/1
5 TABLET ORAL DAILY
COMMUNITY
Start: 2023-10-22

## 2023-11-27 RX ORDER — METHYLPREDNISOLONE 4 MG/1
TABLET ORAL
Qty: 1 KIT | Refills: 0 | Status: SHIPPED | OUTPATIENT
Start: 2023-11-27

## 2023-11-27 RX ORDER — MELOXICAM 15 MG/1
15 TABLET ORAL DAILY
Qty: 30 TABLET | Refills: 3 | Status: SHIPPED | OUTPATIENT
Start: 2023-11-27

## 2023-11-27 NOTE — PROGRESS NOTES
[facility]  [facility] [place] [address], [address] [facility], [address] [place] [address]   Phone: [phone]  Fax: [phone]    Pt's YOB: 1985    ASSESSMENT   Raphael Solorzano was seen today for lower back pain. Diagnoses and all orders for this visit:    Lumbar facet arthropathy  -     meloxicam (MOBIC) 15 MG tablet; Take 1 tablet by mouth daily With dinner.  -     methylPREDNISolone (MEDROL DOSEPACK) 4 MG tablet; Per dose pack instructions. Muscle spasm    Lumbar radiculitis    HNP (herniated nucleus pulposus), lumbar         IMPRESSION AND PLAN:  Bryant Nascimento is a 41 y.o.  male with history of lumbar pain and presents to the office today for medication follow up. Pt continues to complain of right-sided lumbar pain with intermittent radicular symptoms into the posterior aspect of the left lower extremity and occasional cramping in both legs; improved since his last office visit. Pt reports taking calcium and ginger 3-4 x a week with relief of his symptoms. As per his last note, he reported undergoing a L4/5 epidural injection administered on 08/10/2023 by Dr. Laila Huertas MD with exacerbation of his symptoms and has previously undergone physical therapy without improvement. He is taking Mobic 15 mg 1 tab daily and  Flexeril 10 MG occasionally. 1) Pt was given information on herniated disc exercises. 2) He is taking  Mobic 15 mg 1 tab daily as needed for pain and received refills at this time. 3) A Medrol Dosepak was prescribed for acute inflammatory pain. 4) Mr. Angelito Griffith has a reminder for a \"due or due soon\" health maintenance. I have asked that he contact his primary care provider, Peggy Conti MD, for follow-up on this health maintenance. 5)  demonstrated consistency with prescribing. Return in about 6 months (around 5/27/2024) for Medication follow up.         HISTORY OF PRESENT ILLNESS:  Bryant Nascimento is a 41 y.o.  male with history of lumbar pain

## 2024-05-28 ENCOUNTER — OFFICE VISIT (OUTPATIENT)
Age: 39
End: 2024-05-28
Payer: COMMERCIAL

## 2024-05-28 VITALS
HEIGHT: 69 IN | DIASTOLIC BLOOD PRESSURE: 75 MMHG | WEIGHT: 188 LBS | HEART RATE: 76 BPM | BODY MASS INDEX: 27.85 KG/M2 | SYSTOLIC BLOOD PRESSURE: 125 MMHG | OXYGEN SATURATION: 99 %

## 2024-05-28 DIAGNOSIS — M62.838 MUSCLE SPASM: ICD-10-CM

## 2024-05-28 DIAGNOSIS — M47.816 LUMBAR FACET ARTHROPATHY: Primary | ICD-10-CM

## 2024-05-28 DIAGNOSIS — M48.061 SPINAL STENOSIS OF LUMBAR REGION WITHOUT NEUROGENIC CLAUDICATION: ICD-10-CM

## 2024-05-28 PROCEDURE — 99213 OFFICE O/P EST LOW 20 MIN: CPT | Performed by: PHYSICAL MEDICINE & REHABILITATION

## 2024-05-28 RX ORDER — METHYLPREDNISOLONE 4 MG/1
TABLET ORAL
Qty: 1 KIT | Refills: 0 | Status: SHIPPED | OUTPATIENT
Start: 2024-05-28

## 2024-05-28 RX ORDER — FLUTICASONE PROPIONATE 50 MCG
1 SPRAY, SUSPENSION (ML) NASAL DAILY
COMMUNITY
Start: 2024-04-02

## 2024-05-28 RX ORDER — MELOXICAM 15 MG/1
15 TABLET ORAL DAILY
Qty: 30 TABLET | Refills: 3 | Status: SHIPPED | OUTPATIENT
Start: 2024-05-28

## 2024-05-28 NOTE — PATIENT INSTRUCTIONS
Neck Arthritis: Exercises  Introduction  Here are some examples of exercises for you to try. The exercises may be suggested for a condition or for rehabilitation. Start each exercise slowly. Ease off the exercises if you start to have pain.  You will be told when to start these exercises and which ones will work best for you.  How to do the exercises  Neck stretches to the side  This stretch works best if you keep your shoulder down as you lean away from it. To help you remember to do this, start by relaxing your shoulders and lightly holding on to your thighs or your chair.  Tilt your head toward your shoulder and hold for 15 to 30 seconds. Let the weight of your head stretch your muscles.  Repeat 2 to 4 times toward each shoulder.  Chin tuck  Lie on the floor with a rolled-up towel under your neck. Your head should be touching the floor.  Slowly bring your chin toward your chest.  Hold for a count of 6, and then relax for up to 10 seconds.  Repeat 8 to 12 times.  Active cervical rotation  Sit in a firm chair, or stand up straight.  Keeping your chin level, turn your head to the right, and hold for 15 to 30 seconds.  Turn your head to the left and hold for 15 to 30 seconds.  Repeat 2 to 4 times to each side.  Shoulder blade squeeze  While standing, squeeze your shoulder blades together.  Do not raise your shoulders up as you are squeezing.  Hold for 6 seconds.  Repeat 8 to 12 times.  Shoulder rolls  Sit comfortably with your feet shoulder-width apart. You can also do this exercise standing up.  Roll your shoulders up, then back, and then down in a smooth, circular motion.  Repeat 2 to 4 times.    Follow-up care is a key part of your treatment and safety. Be sure to make and go to all appointments, and call your doctor if you are having problems. It's also a good idea to know your test results and keep a list of the medicines you take.  Current as of: March 9, 2022               Content Version: 13.5  ©

## 2024-05-28 NOTE — PROGRESS NOTES
[facility]  [address] [place] [address]., [address]  [place] [address]  Phone: [phone]  Fax: [phone]     Pt's YOB: 1985    ASSESSMENT   Luca was seen today for shoulder pain.    Diagnoses and all orders for this visit:    Lumbar facet arthropathy  -     meloxicam (MOBIC) 15 MG tablet; Take 1 tablet by mouth daily  -     methylPREDNISolone (MEDROL DOSEPACK) 4 MG tablet; Per dose pack instructions.    Muscle spasm    Spinal stenosis of lumbar region without neurogenic claudication         IMPRESSION AND PLAN:  Luca Little is a 38 y.o. RHD male with history of Patient presents today continuing to complain of right-sided lumbar pain with intermittent radicular symptoms into the posterior aspect of the left lower extremity and occasional cramping in both legs. He is taking Mobc 15 mg QD PRN and Flexeril 10 mg PRN. Pt reports taking calcium and deandra 3-4 x a week with relief of his symptoms. He reports that the MDP was beneficial. Per last office note, pt has had an L4/5 epidural injection administered on 08/10/2023 by Dr. Erasmo Valdez MD with exacerbation of his symptoms and has previously undergone physical therapy without improvement.     Patient was prescribed an MDP to use in the event of severe flare of inflammation  Patient was prescribed Mobic 15 mg QD-- he uses this intermittently and gi precautions given  Patient was shown [employer] and was given a handout.    demonstrated consistency with prescribing.   Mr. Little has a reminder for a \"due or due soon\" health maintenance. I have asked that he contact his primary care provider, Lb Diaz MD, for follow-up on this health maintenance.  Return in about 6 months (around 11/28/2024) for Medication follow up.        HISTORY OF PRESENT ILLNESS:  Luca Little is a 38 y.o. RHD male with history of lumbar facet arthropathy, lumbar radiculitis, lumbar HNP, and muscle spasms and

## 2024-11-22 NOTE — PROGRESS NOTES
[facility]  [address] [place] [address]., [address]  [place] [address]  Phone: [phone]  Fax: [phone]    Patient's YOB: 1985    ASSESSMENT   Luca was seen today for back problem and lower back pain.    Diagnoses and all orders for this visit:    Lumbar facet arthropathy  -     methylPREDNISolone (MEDROL DOSEPACK) 4 MG tablet; Take by mouth.  -     meloxicam (MOBIC) 15 MG tablet; Take 1 tablet by mouth daily With dinner.    Muscle spasm    Degeneration of intervertebral disc of lumbar region, unspecified whether pain present         IMPRESSION AND PLAN:  Luca Little is a 39 y.o. male with history of right-sided lumbar pain with intermittent radicular symptoms into the posterior aspect of the left lower extremity and occasional cramping in both legs. He is taking Mobic 15 mg QD PRN and Flexeril 10 mg PRN. Pt reports taking calcium and ginger 3-4 x a week with relief of his symptoms. He reports that the MDP was beneficial. Per last office note, pt has had an L4/5 epidural injection administered on 08/10/2023 by Dr. Erasmo Valdez MD with exacerbation of his symptoms and has previously undergone physical therapy without improvement. .      Patient was given information on muscle conditioning and increasing her core stability exercises.   Patient given refill for Medrol Dosepak to use for acute inflammatory pain  Patient given refill of meloxicam 15 mg that she may take once per day as needed GI precautions given  Mr. Little has a reminder for a \"due or due soon\" health maintenance. I have asked that he contact his primary care provider, Lb Diaz MD, for follow-up on this health maintenance.   demonstrated consistency with prescribing.   Patient given materials on facet injections as well as RFA procedure and back arthritis exercises    Return in about 6 months (around 5/25/2025) for Medication follow up.      HISTORY OF PRESENT

## 2024-11-25 ENCOUNTER — OFFICE VISIT (OUTPATIENT)
Age: 39
End: 2024-11-25
Payer: COMMERCIAL

## 2024-11-25 VITALS
SYSTOLIC BLOOD PRESSURE: 110 MMHG | BODY MASS INDEX: 26.81 KG/M2 | TEMPERATURE: 98.4 F | DIASTOLIC BLOOD PRESSURE: 73 MMHG | WEIGHT: 181 LBS | HEIGHT: 69 IN | RESPIRATION RATE: 16 BRPM | HEART RATE: 73 BPM

## 2024-11-25 DIAGNOSIS — M47.816 LUMBAR FACET ARTHROPATHY: Primary | ICD-10-CM

## 2024-11-25 DIAGNOSIS — M51.369 DEGENERATION OF INTERVERTEBRAL DISC OF LUMBAR REGION, UNSPECIFIED WHETHER PAIN PRESENT: ICD-10-CM

## 2024-11-25 DIAGNOSIS — M62.838 MUSCLE SPASM: ICD-10-CM

## 2024-11-25 PROCEDURE — 99213 OFFICE O/P EST LOW 20 MIN: CPT | Performed by: PHYSICAL MEDICINE & REHABILITATION

## 2024-11-25 RX ORDER — MELOXICAM 15 MG/1
15 TABLET ORAL DAILY
Qty: 30 TABLET | Refills: 5 | Status: SHIPPED | OUTPATIENT
Start: 2024-11-25

## 2024-11-25 RX ORDER — METHYLPREDNISOLONE 4 MG/1
TABLET ORAL
Qty: 1 KIT | Refills: 1 | Status: SHIPPED | OUTPATIENT
Start: 2024-11-25 | End: 2024-12-01

## 2024-11-25 NOTE — PROGRESS NOTES
Luca Pizarrocual presents today for   Chief Complaint   Patient presents with    Back Problem    Lower Back Pain       Is someone accompanying this pt? no    Is the patient using any DME equipment during OV? no    Depression Screening:       No data to display                Learning Assessment:  Failed to redirect to the Timeline version of the Room 77 SmartLink.    Abuse Screening:       No data to display                Fall Risk  Failed to redirect to the Timeline version of the Room 77 SmartLink.    OPIOID RISK TOOL  Failed to redirect to the Timeline version of the Room 77 SmartLink.    Coordination of Care:  1. Have you been to the ER, urgent care clinic since your last visit? no  Hospitalized since your last visit? no    2. Have you seen or consulted any other health care providers outside of the [facility] since your last visit? yes Include any pap smears or colon screening. no

## 2024-11-25 NOTE — PATIENT INSTRUCTIONS
good idea to know your test results and keep a list of the medicines you take.  Current as of: March 9, 2022               Content Version: 13.5  © 2006-2022 [employer].   Care instructions adapted under license by [employer]. If you have questions about a medical condition or this instruction, always ask your healthcare professional. [employer] disclaims any warranty or liability for your use of this information.

## 2025-07-30 ENCOUNTER — OFFICE VISIT (OUTPATIENT)
Age: 40
End: 2025-07-30

## 2025-07-30 VITALS
WEIGHT: 189.4 LBS | TEMPERATURE: 97.2 F | HEIGHT: 69 IN | OXYGEN SATURATION: 98 % | SYSTOLIC BLOOD PRESSURE: 124 MMHG | BODY MASS INDEX: 28.05 KG/M2 | HEART RATE: 84 BPM | DIASTOLIC BLOOD PRESSURE: 70 MMHG

## 2025-07-30 DIAGNOSIS — M47.816 LUMBAR FACET ARTHROPATHY: ICD-10-CM

## 2025-07-30 RX ORDER — MELOXICAM 15 MG/1
15 TABLET ORAL DAILY
Qty: 30 TABLET | Refills: 3 | Status: SHIPPED | OUTPATIENT
Start: 2025-07-30

## 2025-07-30 NOTE — PROGRESS NOTES
dictation was completed with [employer], the [employer] voice recognition software.  Quite often unanticipated grammatical, syntax, homophones, and other interpretive errors are inadvertently transcribed by the computer software.  Please disregard these errors.  Please excuse any errors that have escaped final proofreading.     GORDON Cancino - NP